# Patient Record
Sex: FEMALE | Race: WHITE | NOT HISPANIC OR LATINO | URBAN - METROPOLITAN AREA
[De-identification: names, ages, dates, MRNs, and addresses within clinical notes are randomized per-mention and may not be internally consistent; named-entity substitution may affect disease eponyms.]

---

## 2025-03-25 ENCOUNTER — APPOINTMENT (OUTPATIENT)
Dept: CARDIOLOGY | Facility: CLINIC | Age: 85
End: 2025-03-25

## 2025-04-21 ENCOUNTER — APPOINTMENT (OUTPATIENT)
Dept: CARDIOLOGY | Facility: CLINIC | Age: 85
End: 2025-04-21

## 2025-07-02 ENCOUNTER — APPOINTMENT (OUTPATIENT)
Dept: CARDIOLOGY | Facility: CLINIC | Age: 85
End: 2025-07-02
Payer: MEDICARE

## 2025-07-02 VITALS
HEIGHT: 64 IN | WEIGHT: 204.6 LBS | SYSTOLIC BLOOD PRESSURE: 126 MMHG | DIASTOLIC BLOOD PRESSURE: 70 MMHG | HEART RATE: 77 BPM | BODY MASS INDEX: 34.93 KG/M2

## 2025-07-02 DIAGNOSIS — E78.00 PURE HYPERCHOLESTEROLEMIA: ICD-10-CM

## 2025-07-02 DIAGNOSIS — R06.02 SHORTNESS OF BREATH: Primary | ICD-10-CM

## 2025-07-02 DIAGNOSIS — R07.89 CHEST PRESSURE: ICD-10-CM

## 2025-07-02 RX ORDER — ATENOLOL 100 MG/1
TABLET ORAL
Qty: 1 TABLET | Refills: 0 | Status: SHIPPED | OUTPATIENT
Start: 2025-07-02

## 2025-07-02 RX ORDER — ROSUVASTATIN CALCIUM 5 MG/1
5 TABLET, COATED ORAL
COMMUNITY
Start: 2024-08-30

## 2025-07-02 RX ORDER — ASPIRIN 81 MG/1
81 TABLET ORAL
COMMUNITY

## 2025-07-02 RX ORDER — DILTIAZEM HYDROCHLORIDE 180 MG/1
180 CAPSULE, COATED, EXTENDED RELEASE ORAL
COMMUNITY
Start: 2024-12-04

## 2025-07-02 RX ORDER — GABAPENTIN 100 MG/1
400 CAPSULE ORAL DAILY
COMMUNITY
Start: 2024-05-29

## 2025-07-02 RX ORDER — TRAZODONE HYDROCHLORIDE 50 MG/1
50 TABLET ORAL NIGHTLY
COMMUNITY
Start: 2025-06-25

## 2025-07-02 NOTE — PATIENT INSTRUCTIONS
YOU NEED TO TAKE A 1 TIME DOSE OF ATENOLOL 100 MG 1 HOUR BEFORE YOUR CORONARY CT ANGIOGRAM.  CALL [facility] WHEN YOU HAVE YOUR TESTING DATE TO FILL THE PRESCRIPTION--HOLD DILTIAZEM(CARDIZEM) THE NIGHT BEFORE THE TEST    Please bring all medicines, vitamins, and herbal supplements with you in original bottles to every appointment! This is the best way to ensure your medication list in your chart is accurate.    Prescriptions will not be filled unless you are compliant with your follow up appointments or have a follow up appointment scheduled as per instruction of your physician. Refills should be requested at the time of your visit.     DID YOU KNOW  We have a pharmacy here in the [facility].  They can fill all prescriptions, not just cardiac medications.  Prescriptions from other pharmacies can easily be transferred to the  pharmacy by the  pharmacist on site.   pharmacies offer FREE HOME DELIVERY on medications to anywhere in [place]. They can sync your medications. Typically prescriptions can be ready in 10 - 15 minutes. If pharmacy is unable to fill your  prescription or if cost is more than your paying now the Pharmacist can easily transfer back to your Pharmacy of choice. Pharmacy phone # [phone].

## 2025-07-02 NOTE — PROGRESS NOTES
Patient:  Clementine Aguilera  YOB: 1940  MRN: [id number]       Impression/Plan:     Diagnoses and all orders for this visit:  Shortness of breath  Chest pressure  -     She is at risk of coronary disease with her metabolic syndrome, hypertension and hyperlipidemia.  -     Her arthritis would not allow her to exercise adequately on a treadmill.  Perfusion study could be affected by soft tissue attenuation I think she would be best served by coronary CT angiography.  She will take atenolol 100 mg an hour prior to the study to afford heart rate control and optimize imaging.  She will hold her calcium channel blocker the day before to avoid interaction that could cause significant bradycardia.  -     Her shortness of breath may be primarily deconditioning and obesity.  She has gained weight because her arthritis has slowed her down quite a bit and she has difficulty doing any regular exercise  -     Follow Up In Cardiology; Future  -     CT angio coronary art with heartflow if score >30%; Future  -     Transthoracic Echo Complete; Future  -     atenolol (Tenormin) 100 mg tablet; 1 TABLET (ONE TIME DOSE) 1 HOUR PRIOR TO YOUR CORONARY CT ANGIOGRAM  Pure hypercholesterolemia         -    LDL cholesterol of 83 fairly reasonable control probably just watching her diet a bit closer may allow that to come to 70 on a current dose of statin.      Chief Complaint/Active Symptoms:      Chief Complaint   Patient presents with    Shortness of Breath     Presents today for NPV AND SOB ONGOING 10 YEARS       Clementine Aguilera is a 84 y.o. female who presents with hypertension, hyperlipidemia, metabolic syndrome and sleep apnea.      Echocardiogram [facility] 2023  Technically difficult exam due to body habitus and suboptimal positioning.   - Exam indication: Shortness of Breath   - The left ventricle is normal in size. There is mild septal left ventricular   hypertrophy. Left ventricular systolic function is normal.  EF = 70 ± 5% (2D   biplane) Indeterminate left ventricular diastolic dysfunction.   - The right ventricle is normal in size. Right ventricular systolic function is   normal.   - Mild aortic sclerosis without any stenosis.   - Exam was compared with the prior  echocardiographic exam performed on   04/20/2016 (STRESS). There is no significant change when compared to prior rest   study.     6/25/2025 hemoglobin A1c normal at 5.5 CMP unremarkable except creatinine 1.02 CBC unremarkable  December 2024 cholesterol 161 HDL 58 LDL 80    I had seen her in 2019 at which time she was without evidence to suggest significant coronary disease, LV dysfunction or valvular heart disease.  Recently she has noted episodes of shortness of breath.  She sob sometimes just walking around her house she will feel somewhat dyspneic if she walks too fast.  On 1 occasion she felt a strange sensation in her chest a bit of pressure but that had only occurred a couple of times.  She has no PND or orthopnea.  She has had no prior history myocardial infarction, stroke or symptoms of peripheral vascular disease.  2018 stress test suggested mild ischemia repeat stress test a year later showed no ischemia.  She has had no neurologic symptoms to suggest TIA or stroke.  She does use her CPAP on a regular basis as to her sleep apnea.    She has had evidence of metabolic syndrome but has been watching her diet much better has lost weight and hemoglobin A1c has improved as well    ECG: Normal sinus rhythm poor R wave progression otherwise normal study      Past medical history  Osteoarthritis  Hypertension/hyperlipidemia  Sleep apnea  Obesity  Metabolic syndrome  Anxiety  Chronic back pain  COPD/mild asthma    Social history  No tobacco, drugs of abuse, alcohol    Surgical History[1]   No past cardiac surgery    Family history  No premature coronary disease    Review of Systems: Unremarkable except as noted above    Meds     Current Outpatient  "Medications   Medication Instructions    aspirin 81 mg, Daily RT    dilTIAZem CD (CARDIZEM CD) 180 mg, Daily RT    gabapentin (NEURONTIN) 400 mg, Daily    rosuvastatin (CRESTOR) 5 mg, Daily RT    traZODone (DESYREL) 50 mg, Nightly        Allergies   Allergies[2]      Annotated Problems     Specialty Problems    None       Problem List   There are no active problems to display for this patient.      Objective:     Vitals:    07/02/25 1010   BP: 126/70   BP Location: Left arm   Patient Position: Sitting   Pulse: 77   Weight: 92.8 kg (204 lb 9.6 oz)   Height: 1.626 m (5' 4\")      Wt Readings from Last 4 Encounters:   07/02/25 92.8 kg (204 lb 9.6 oz)           LAB:     Lab Results   Component Value Date    HGBA1C 5.5 06/25/2025         Physical Exam     General Appearance: alert and oriented to person, place and time, in no acute distress  Cardiovascular: normal rate, regular rhythm, normal S1 and S2, no murmurs, rubs, clicks, or gallops,  no JVD  Pulmonary/Chest: clear to auscultation bilaterally- no wheezes, rales or rhonchi, normal air movement, no respiratory distress  Abdomen: soft, non-tender, non-distended, normal bowel sounds, no masses   Extremities: no cyanosis, clubbing or edema  Skin: warm and dry, no rash or erythema  Eyes: EOMI  Neck: supple and non-tender without mass, no thyromegaly   Neurological: alert, oriented, normal speech, no focal findings or movement disorder noted  Vascular:     Scribe Attestation  By signing my name below, I, Renea Boyd CMA   , Scribe   attest that this documentation has been prepared under the direction and in the presence of John Jack MD.     Provider attestation-scribe documentation  Any medical record entries made by the scribe were at my discretion and personally dictated by me.  I have reviewed the chart and agree that the record accurately reflects my personal performance of the history, physical exam, discussion and plan.                 [1] No past surgical " history on file.  [2]   Allergies  Allergen Reactions    Atorvastatin Myalgia     leg cramps

## 2025-07-18 ENCOUNTER — HOSPITAL ENCOUNTER (OUTPATIENT)
Dept: RADIOLOGY | Facility: CLINIC | Age: 85
Discharge: HOME | End: 2025-07-18
Payer: MEDICARE

## 2025-07-18 VITALS
HEART RATE: 71 BPM | OXYGEN SATURATION: 97 % | DIASTOLIC BLOOD PRESSURE: 75 MMHG | SYSTOLIC BLOOD PRESSURE: 149 MMHG | RESPIRATION RATE: 16 BRPM

## 2025-07-18 DIAGNOSIS — R06.02 SHORTNESS OF BREATH: ICD-10-CM

## 2025-07-18 DIAGNOSIS — R07.89 CHEST PRESSURE: ICD-10-CM

## 2025-07-18 PROCEDURE — 2550000001 HC RX 255 CONTRASTS: Performed by: INTERNAL MEDICINE

## 2025-07-18 PROCEDURE — 75574 CT ANGIO HRT W/3D IMAGE: CPT

## 2025-07-18 PROCEDURE — 2500000001 HC RX 250 WO HCPCS SELF ADMINISTERED DRUGS (ALT 637 FOR MEDICARE OP): Performed by: INTERNAL MEDICINE

## 2025-07-18 RX ORDER — NITROGLYCERIN 400 UG/1
2 SPRAY ORAL ONCE
Status: COMPLETED | OUTPATIENT
Start: 2025-07-18 | End: 2025-07-18

## 2025-07-18 RX ADMIN — NITROGLYCERIN 2 SPRAY: 400 SPRAY ORAL at 12:27

## 2025-07-18 RX ADMIN — IOHEXOL 85 ML: 350 INJECTION, SOLUTION INTRAVENOUS at 12:47

## 2025-07-18 NOTE — NURSING NOTE
Post CCTA pt denies feeling dizzy or lightheaded, denies headache. Steady on feet, skin warm pink and dry. Pt verbalized understanding of increased water intake x24 hours, educated on side effects of medications. HL removed with tip intact, manual pressure held until hemostasis achieved, 2x2 and coban to site. Pt DC home to self care.

## 2025-07-24 ENCOUNTER — APPOINTMENT (OUTPATIENT)
Dept: RADIOLOGY | Facility: CLINIC | Age: 85
End: 2025-07-24
Payer: MEDICARE

## 2025-07-25 ENCOUNTER — APPOINTMENT (OUTPATIENT)
Dept: RADIOLOGY | Facility: CLINIC | Age: 85
End: 2025-07-25
Payer: MEDICARE

## 2025-08-05 ENCOUNTER — ANCILLARY PROCEDURE (OUTPATIENT)
Dept: CARDIOLOGY | Facility: HOSPITAL | Age: 85
End: 2025-08-05
Payer: MEDICARE

## 2025-08-05 DIAGNOSIS — R06.02 SHORTNESS OF BREATH: ICD-10-CM

## 2025-08-05 DIAGNOSIS — R07.89 CHEST PRESSURE: ICD-10-CM

## 2025-08-05 PROCEDURE — 93306 TTE W/DOPPLER COMPLETE: CPT

## 2025-08-05 PROCEDURE — 93306 TTE W/DOPPLER COMPLETE: CPT | Performed by: INTERNAL MEDICINE

## 2025-08-06 LAB
AORTIC VALVE MEAN GRADIENT: 5 MMHG
AORTIC VALVE PEAK VELOCITY: 1.57 M/S
AV PEAK GRADIENT: 10 MMHG
AVA (PEAK VEL): 2.28 CM2
AVA (VTI): 2.2 CM2
EJECTION FRACTION APICAL 4 CHAMBER: 67.2
EJECTION FRACTION: 65 %
LEFT VENTRICLE INTERNAL DIMENSION DIASTOLE: 3.89 CM (ref 3.5–6)
LEFT VENTRICULAR OUTFLOW TRACT DIAMETER: 1.9 CM
LV EJECTION FRACTION BIPLANE: 61 %
MITRAL VALVE E/A RATIO: 0.89
MITRAL VALVE E/E' RATIO: 20.1
RIGHT VENTRICLE FREE WALL PEAK S': 9.55 CM/S
RIGHT VENTRICLE PEAK SYSTOLIC PRESSURE: 26 MMHG
TRICUSPID ANNULAR PLANE SYSTOLIC EXCURSION: 1.8 CM

## 2025-08-13 ENCOUNTER — APPOINTMENT (OUTPATIENT)
Dept: CARDIOLOGY | Facility: CLINIC | Age: 85
End: 2025-08-13
Payer: MEDICARE

## 2025-08-13 VITALS
SYSTOLIC BLOOD PRESSURE: 124 MMHG | BODY MASS INDEX: 34.15 KG/M2 | HEIGHT: 64 IN | DIASTOLIC BLOOD PRESSURE: 62 MMHG | WEIGHT: 200 LBS | HEART RATE: 74 BPM

## 2025-08-13 DIAGNOSIS — I50.31 ACUTE DIASTOLIC CHF (CONGESTIVE HEART FAILURE), NYHA CLASS 2: ICD-10-CM

## 2025-08-13 DIAGNOSIS — I10 ESSENTIAL (PRIMARY) HYPERTENSION: ICD-10-CM

## 2025-08-13 DIAGNOSIS — R06.02 SHORTNESS OF BREATH: ICD-10-CM

## 2025-08-13 PROCEDURE — 1159F MED LIST DOCD IN RCRD: CPT | Performed by: INTERNAL MEDICINE

## 2025-08-13 PROCEDURE — 3078F DIAST BP <80 MM HG: CPT | Performed by: INTERNAL MEDICINE

## 2025-08-13 PROCEDURE — 3074F SYST BP LT 130 MM HG: CPT | Performed by: INTERNAL MEDICINE

## 2025-08-13 PROCEDURE — G2211 COMPLEX E/M VISIT ADD ON: HCPCS | Performed by: INTERNAL MEDICINE

## 2025-08-13 PROCEDURE — 99214 OFFICE O/P EST MOD 30 MIN: CPT | Performed by: INTERNAL MEDICINE

## 2025-08-13 PROCEDURE — 1036F TOBACCO NON-USER: CPT | Performed by: INTERNAL MEDICINE

## 2025-08-13 RX ORDER — SPIRONOLACTONE 25 MG/1
25 TABLET ORAL DAILY
Qty: 90 TABLET | Refills: 3 | Status: SHIPPED | OUTPATIENT
Start: 2025-08-13 | End: 2026-08-13

## 2025-08-21 ENCOUNTER — RESULTS FOLLOW-UP (OUTPATIENT)
Dept: CARDIOLOGY | Facility: CLINIC | Age: 85
End: 2025-08-21
Payer: MEDICARE

## 2025-08-21 LAB
ANION GAP SERPL CALCULATED.4IONS-SCNC: 11 MMOL/L (CALC) (ref 7–17)
BUN SERPL-MCNC: 18 MG/DL (ref 7–25)
BUN/CREAT SERPL: 19 (CALC) (ref 6–22)
CALCIUM SERPL-MCNC: 9.8 MG/DL (ref 8.6–10.4)
CHLORIDE SERPL-SCNC: 105 MMOL/L (ref 98–110)
CO2 SERPL-SCNC: 24 MMOL/L (ref 20–32)
CREAT SERPL-MCNC: 0.97 MG/DL (ref 0.6–0.95)
EGFRCR SERPLBLD CKD-EPI 2021: 58 ML/MIN/1.73M2
GLUCOSE SERPL-MCNC: 85 MG/DL (ref 65–99)
POTASSIUM SERPL-SCNC: 4.3 MMOL/L (ref 3.5–5.3)
SODIUM SERPL-SCNC: 140 MMOL/L (ref 135–146)

## 2025-11-12 ENCOUNTER — APPOINTMENT (OUTPATIENT)
Dept: CARDIOLOGY | Facility: CLINIC | Age: 85
End: 2025-11-12
Payer: MEDICARE